# Patient Record
Sex: MALE | Race: WHITE | NOT HISPANIC OR LATINO
[De-identification: names, ages, dates, MRNs, and addresses within clinical notes are randomized per-mention and may not be internally consistent; named-entity substitution may affect disease eponyms.]

---

## 2021-05-07 ENCOUNTER — NON-APPOINTMENT (OUTPATIENT)
Age: 51
End: 2021-05-07

## 2021-05-07 ENCOUNTER — APPOINTMENT (OUTPATIENT)
Dept: OTOLARYNGOLOGY | Facility: CLINIC | Age: 51
End: 2021-05-07
Payer: COMMERCIAL

## 2021-05-07 VITALS
HEART RATE: 65 BPM | TEMPERATURE: 98.3 F | BODY MASS INDEX: 25.84 KG/M2 | OXYGEN SATURATION: 98 % | SYSTOLIC BLOOD PRESSURE: 105 MMHG | HEIGHT: 73 IN | DIASTOLIC BLOOD PRESSURE: 70 MMHG | WEIGHT: 195 LBS

## 2021-05-07 DIAGNOSIS — H93.12 TINNITUS, LEFT EAR: ICD-10-CM

## 2021-05-07 DIAGNOSIS — Z86.39 PERSONAL HISTORY OF OTHER ENDOCRINE, NUTRITIONAL AND METABOLIC DISEASE: ICD-10-CM

## 2021-05-07 DIAGNOSIS — H93.8X2 OTHER SPECIFIED DISORDERS OF LEFT EAR: ICD-10-CM

## 2021-05-07 DIAGNOSIS — H91.22 SUDDEN IDIOPATHIC HEARING LOSS, LEFT EAR: ICD-10-CM

## 2021-05-07 DIAGNOSIS — H61.23 IMPACTED CERUMEN, BILATERAL: ICD-10-CM

## 2021-05-07 DIAGNOSIS — Z78.9 OTHER SPECIFIED HEALTH STATUS: ICD-10-CM

## 2021-05-07 DIAGNOSIS — J34.2 DEVIATED NASAL SEPTUM: ICD-10-CM

## 2021-05-07 DIAGNOSIS — Z82.3 FAMILY HISTORY OF STROKE: ICD-10-CM

## 2021-05-07 PROBLEM — Z00.00 ENCOUNTER FOR PREVENTIVE HEALTH EXAMINATION: Status: ACTIVE | Noted: 2021-05-07

## 2021-05-07 PROCEDURE — 99072 ADDL SUPL MATRL&STAF TM PHE: CPT

## 2021-05-07 PROCEDURE — G0268 REMOVAL OF IMPACTED WAX MD: CPT

## 2021-05-07 PROCEDURE — 99204 OFFICE O/P NEW MOD 45 MIN: CPT | Mod: 25

## 2021-05-07 PROCEDURE — 31231 NASAL ENDOSCOPY DX: CPT

## 2021-05-07 PROCEDURE — 92557 COMPREHENSIVE HEARING TEST: CPT

## 2021-05-07 PROCEDURE — 92550 TYMPANOMETRY & REFLEX THRESH: CPT

## 2021-05-07 RX ORDER — PREDNISONE 10 MG/1
10 TABLET ORAL
Qty: 45 | Refills: 0 | Status: ACTIVE | COMMUNITY
Start: 2021-05-07 | End: 1900-01-01

## 2021-05-07 RX ORDER — OMEPRAZOLE 40 MG/1
40 CAPSULE, DELAYED RELEASE ORAL
Qty: 14 | Refills: 0 | Status: ACTIVE | COMMUNITY
Start: 2021-05-07 | End: 1900-01-01

## 2021-05-08 PROBLEM — H93.8X2 SENSATION OF PLUGGED EAR ON LEFT SIDE: Status: ACTIVE | Noted: 2021-05-07

## 2021-05-08 PROBLEM — Z82.3 FAMILY HISTORY OF CEREBROVASCULAR ACCIDENT (CVA): Status: ACTIVE | Noted: 2021-05-07

## 2021-05-08 PROBLEM — Z78.9 CAFFEINE USE: Status: ACTIVE | Noted: 2021-05-07

## 2021-05-08 PROBLEM — Z86.39 HISTORY OF THYROID DISORDER: Status: RESOLVED | Noted: 2021-05-07 | Resolved: 2021-05-08

## 2021-05-08 PROBLEM — J34.2 DEVIATED NASAL SEPTUM: Status: ACTIVE | Noted: 2021-05-08

## 2021-05-08 PROBLEM — H93.12 TINNITUS OF LEFT EAR: Status: ACTIVE | Noted: 2021-05-07

## 2021-05-08 PROBLEM — H61.23 BILATERAL IMPACTED CERUMEN: Status: ACTIVE | Noted: 2021-05-08

## 2021-05-08 PROBLEM — Z78.9 NON-SMOKER: Status: ACTIVE | Noted: 2021-05-07

## 2021-05-08 PROBLEM — H93.12 LEFT-SIDED TINNITUS: Status: ACTIVE | Noted: 2021-05-08

## 2021-05-08 PROBLEM — H91.22 SUDDEN HEARING LOSS, LEFT: Status: ACTIVE | Noted: 2021-05-07

## 2021-05-08 RX ORDER — THYROID, PORCINE 90 MG/1
TABLET ORAL
Refills: 0 | Status: ACTIVE | COMMUNITY

## 2021-05-08 NOTE — PROCEDURE
[Anterior rhinoscopy insufficient to account for symptoms] : anterior rhinoscopy insufficient to account for symptoms [Posterior Lesion] : posterior lesion [Flexible Endoscope] : examined with the flexible endoscope [Serial Number: ___] : Serial Number: [unfilled] [Normal] : the paranasal sinuses had no abnormalities [Same] : same as the Pre Op Dx. [Cerumen Impaction] : Cerumen Impaction [] : Removal of Cerumen [FreeTextEntry6] : b copious cerumen removed with suction under microscope

## 2021-05-08 NOTE — ASSESSMENT
[FreeTextEntry1] : 1)cerumen removed\par sensation and hearing improved but did not resolve\par 2) checked mild hf hearing discrepancy in l ear cw sudden hl\par discussed po and IT steroids -he agreed with medrol dosepack\par no htn dm pud infx or psychj\par ordered and told to call and stop for any problems\par omep\par rtc 1 week with mri\par 3) deviated septum turb hypertrophy- I asked whether he had tried flonase- he said he did in the past but did not help. DIscussed risks benefits and alts to smr/turbs - he said he would consider srp - I asked staff to give hi8m contact info for 3 physicians who are both ent and facial plastics experts

## 2021-05-08 NOTE — REASON FOR VISIT
[Subsequent Evaluation] : a subsequent evaluation for [FreeTextEntry2] : l blocked ear, l tinnitus deviated septum

## 2021-05-08 NOTE — PHYSICAL EXAM
[] : septum deviated bilaterally [Nasal Endoscopy Performed] : nasal endoscopy was performed, see procedure section for findings [Normal] : no rashes [de-identified] : b copious cerumen impaction removed atraumatically with suction under microscope [de-identified] : some dorsal deformity  [de-identified] : gait steady

## 2021-05-08 NOTE — HISTORY OF PRESENT ILLNESS
[de-identified] : 50 yo man with l ear plugged for 5 d. Hearing is decreased. It has happened to him with swimming in the past. He  \par also has h/o deviated septum - broke nose playing basketball in 2003 - adds to difficulty sleeping - has considered repair in the past -  l side blocked - happened 2003 worse over time. had lyme- treated and has b tinnitus for years. No relevant fh or sh. No vertigo.

## 2021-05-26 ENCOUNTER — APPOINTMENT (OUTPATIENT)
Dept: OTOLARYNGOLOGY | Facility: CLINIC | Age: 51
End: 2021-05-26

## 2023-05-19 ENCOUNTER — APPOINTMENT (OUTPATIENT)
Dept: UROLOGY | Facility: CLINIC | Age: 53
End: 2023-05-19
Payer: COMMERCIAL

## 2023-05-19 DIAGNOSIS — Z12.5 ENCOUNTER FOR SCREENING FOR MALIGNANT NEOPLASM OF PROSTATE: ICD-10-CM

## 2023-05-19 DIAGNOSIS — R39.9 UNSPECIFIED SYMPTOMS AND SIGNS INVOLVING THE GENITOURINARY SYSTEM: ICD-10-CM

## 2023-05-19 DIAGNOSIS — R37 SEXUAL DYSFUNCTION, UNSPECIFIED: ICD-10-CM

## 2023-05-19 PROCEDURE — 99204 OFFICE O/P NEW MOD 45 MIN: CPT | Mod: 25

## 2023-05-19 PROCEDURE — 51798 US URINE CAPACITY MEASURE: CPT

## 2023-05-19 PROCEDURE — 51741 ELECTRO-UROFLOWMETRY FIRST: CPT

## 2023-05-19 RX ORDER — MINOXIDIL 2.5 MG/1
TABLET ORAL
Refills: 0 | Status: ACTIVE | COMMUNITY

## 2023-05-19 NOTE — PHYSICAL EXAM
[Bowel Sounds] : normal bowel sounds [Abdomen Soft] : soft [Abdomen Tenderness] : non-tender [] : no hepato-splenomegaly [Abdomen Mass (___ Cm)] : no abdominal mass palpated [Abdomen Hernia] : no hernia was discovered [Costovertebral Angle Tenderness] : no ~M costovertebral angle tenderness [Urethral Meatus] : meatus normal [Epididymis] : the epididymides were normal [Testes Tenderness] : no tenderness of the testes [Testes Mass (___cm)] : there were no testicular masses [Prostate Enlargement] : the prostate was not enlarged [Prostate Tenderness] : the prostate was not tender [FreeTextEntry1] : flow 5.8; voided 79 volume; PVR 28; SOUMYA SIERRA was offered to have a chaperone present  and declined.

## 2023-05-19 NOTE — HISTORY OF PRESENT ILLNESS
[Currently Experiencing ___] :  [unfilled] [Nocturia] : nocturia [Weak Stream] : weak stream [FreeTextEntry1] : 53 year old \par  9 years , in process of getting \par 1 daughter\par developed hematospermia x one event\par no pain \par no dysuria\par GC and Chlamydia testing was reported\par new partner; no condoms\par partner asymptomatic\par "my ejaculate has been thin and low volume"\par no history of trauma; infection\par  [Urinary Incontinence] : no urinary incontinence [Urinary Retention] : no urinary retention [Urinary Urgency] : no urinary urgency [Urinary Frequency] : no urinary frequency [Intermittency] : no intermittency [Post-Void Dribbling] : no post-void dribbling

## 2023-05-19 NOTE — ASSESSMENT
[FreeTextEntry1] : Mr Alcantara is a 53-year-old  who is  and undergoing divorce.  He has new sexual partner.  He presents with 1 episode of hematospermia.  This was not associated with any systemic symptoms.  He has no history of trauma or catheterization STIs etc.  He had a recent chlamydia and gonorrhea test which were normal.\par \par On examination there are no specific findings his testicles are normal his prostate is small smooth benign.  His urine flow rate demonstrates a maximum flow of 5.8 cc with a voided volume of 79.2 cc.  He has a postvoid residual of 28 cc.\par \par He currently has the following issues to address:\par 1.  Hematospermia.  We discussed the benign nature of hematospermia and the fact that no further evaluation is required unless there is persistent or recurrent hematospermia. \par \par 2 he does have some sexual dysfunction.  He takes Viagra occasionally with good response.  He does not like taking medications.\par \par 3.  He has mild lower urinary tract symptoms.  He does not seem bothered by them.  He has mildly decreased flow and a minimal postvoid residual.  We discussed tadalafil 5 mg daily for LUTS and ED. \par He is not interested at this time.\par \par 4.  We discussed prostate cancer screening and the controversy related to this.  He wishes to undergo this.\par \par At this point we plan to proceed with endocrinologic evaluation in light of his sexual dysfunction and decreased ejaculate volume.  He states that his ejaculate volume has decreased in volume as well as consistency.  He is bothered by this.  We discussed potential prostate ultrasound if indicated after endocrine studies and semen analysis \par \par Plan:\par 1.  Endocrine studies\par 2.  Semen analysis\par 3.  PSA\par 4.  Follow-up after semen analysis and endocrine studies to evaluate and outline further management which may include transrectal ultrasonography in light of his complaint of decreased ejaculate volume depending on the results of the studies and the endocrine studies\par The SOUMYA ALCANTARA  expressed fully understanding of the information provided, the consequences and the management.\par

## 2023-05-23 ENCOUNTER — NON-APPOINTMENT (OUTPATIENT)
Age: 53
End: 2023-05-23

## 2023-05-23 LAB
ESTRADIOL SERPL-MCNC: 20 PG/ML
FSH SERPL-MCNC: 2.8 IU/L
LH SERPL-ACNC: 3.1 IU/L
PSA SERPL-MCNC: 0.5 NG/ML
TESTOST SERPL-MCNC: 545 NG/DL

## 2023-06-07 ENCOUNTER — NON-APPOINTMENT (OUTPATIENT)
Age: 53
End: 2023-06-07

## 2023-06-23 ENCOUNTER — APPOINTMENT (OUTPATIENT)
Dept: UROLOGY | Facility: CLINIC | Age: 53
End: 2023-06-23

## 2023-06-27 ENCOUNTER — APPOINTMENT (OUTPATIENT)
Dept: UROLOGY | Facility: CLINIC | Age: 53
End: 2023-06-27
Payer: COMMERCIAL

## 2023-06-27 DIAGNOSIS — N42.83 CYST OF PROSTATE: ICD-10-CM

## 2023-06-27 DIAGNOSIS — R86.8 OTHER ABNORMAL FINDINGS IN SPECIMENS FROM MALE GENITAL ORGANS: ICD-10-CM

## 2023-06-27 DIAGNOSIS — R36.1 HEMATOSPERMIA: ICD-10-CM

## 2023-06-27 PROCEDURE — 99213 OFFICE O/P EST LOW 20 MIN: CPT

## 2023-06-27 PROCEDURE — 76872 US TRANSRECTAL: CPT

## 2023-06-27 NOTE — HISTORY OF PRESENT ILLNESS
[FreeTextEntry1] : 53 year old \par  9 years , in process of getting \par 1 daughter\par developed hematospermia x one event\par no pain \par no dysuria\par GC and Chlamydia testing was reported\par new partner; no condoms\par partner asymptomatic\par "my ejaculate has been thin and low volume"\par no history of trauma; infection\par \par \par 6.27.2023\par returns re reduced ejaculate volume\par

## 2023-06-27 NOTE — ASSESSMENT
[FreeTextEntry1] : Mr Alcantara is a 53-year-old  who is  and undergoing divorce.  He has new sexual partner.  He presents with 1 episode of hematospermia.  This was not associated with any systemic symptoms.  He has no history of trauma or catheterization STIs etc.  He had a recent chlamydia and gonorrhea test which were normal.\par \par On examination there are no specific findings his testicles are normal his prostate is small smooth benign.  His urine flow rate demonstrates a maximum flow of 5.8 cc with a voided volume of 79.2 cc.  He has a postvoid residual of 28 cc.\par \par He currently has the following issues to address:\par 1.  Hematospermia.  We discussed the benign nature of hematospermia and the fact that no further evaluation is required unless there is persistent or recurrent hematospermia. \par \par 2 he does have some sexual dysfunction.  He takes Viagra occasionally with good response.  He does not like taking medications.\par \par 3.  He has mild lower urinary tract symptoms.  He does not seem bothered by them.  He has mildly decreased flow and a minimal postvoid residual.  We discussed tadalafil 5 mg daily for LUTS and ED. \par He is not interested at this time.\par \par 4.  We discussed prostate cancer screening and the controversy related to this.  He wishes to undergo this.\par \par At this point we plan to proceed with endocrinologic evaluation in light of his sexual dysfunction and decreased ejaculate volume.  He states that his ejaculate volume has decreased in volume as well as consistency.  He is bothered by this.  We discussed potential prostate ultrasound if indicated after endocrine studies and semen analysis \par \par Plan:\par 1.  Endocrine studies\par 2.  Semen analysis\par 3.  PSA\par 4.  Follow-up after semen analysis and endocrine studies to evaluate and outline further management which may include transrectal ultrasonography in light of his complaint of decreased ejaculate volume depending on the results of the studies and the endocrine studies\par The SOUMYA ALCANTARA  expressed fully understanding of the information provided, the consequences and the management.\par \par 6.27.2023\par Patient returns regarding decreased ejaculate volume.  \par Semen analysis does not confirm a low volume.  He has no hematospermia at this time.\par Prostate ultrasound today demonstrated a question midline cyst in the prostate.  This was demonstrated to the patient. SV were not enlarged\par \par We discussed options.  We discussed proceeding with an MRI.  He wishes to proceed.  Arrangements  will be made.\par \par